# Patient Record
Sex: FEMALE | Race: WHITE | Employment: OTHER | URBAN - METROPOLITAN AREA
[De-identification: names, ages, dates, MRNs, and addresses within clinical notes are randomized per-mention and may not be internally consistent; named-entity substitution may affect disease eponyms.]

---

## 2017-03-29 ENCOUNTER — HOSPITAL ENCOUNTER (EMERGENCY)
Facility: CLINIC | Age: 22
Discharge: HOME | End: 2017-03-29
Attending: EMERGENCY MEDICINE | Admitting: EMERGENCY MEDICINE
Payer: COMMERCIAL

## 2017-03-29 VITALS
TEMPERATURE: 97.9 F | RESPIRATION RATE: 18 BRPM | OXYGEN SATURATION: 99 % | DIASTOLIC BLOOD PRESSURE: 74 MMHG | HEART RATE: 90 BPM | SYSTOLIC BLOOD PRESSURE: 113 MMHG

## 2017-03-29 DIAGNOSIS — G89.29 CHRONIC ABDOMINAL PAIN: ICD-10-CM

## 2017-03-29 DIAGNOSIS — R10.9 CHRONIC ABDOMINAL PAIN: ICD-10-CM

## 2017-03-29 DIAGNOSIS — K92.1 HEMATOCHEZIA: ICD-10-CM

## 2017-03-29 LAB
ALBUMIN SERPL-MCNC: 3.5 G/DL (ref 3.4–5)
ALBUMIN UR-MCNC: NEGATIVE MG/DL
ALP SERPL-CCNC: 49 U/L (ref 40–150)
ALT SERPL W P-5'-P-CCNC: 18 U/L (ref 0–50)
AMORPH CRY #/AREA URNS HPF: ABNORMAL /HPF
ANION GAP SERPL CALCULATED.3IONS-SCNC: 8 MMOL/L (ref 3–14)
APPEARANCE UR: ABNORMAL
AST SERPL W P-5'-P-CCNC: 14 U/L (ref 0–45)
BACTERIA #/AREA URNS HPF: ABNORMAL /HPF
BILIRUB SERPL-MCNC: 0.5 MG/DL (ref 0.2–1.3)
BILIRUB UR QL STRIP: NEGATIVE
BUN SERPL-MCNC: 13 MG/DL (ref 7–30)
CALCIUM SERPL-MCNC: 8.2 MG/DL (ref 8.5–10.1)
CHLORIDE SERPL-SCNC: 105 MMOL/L (ref 94–109)
CO2 SERPL-SCNC: 27 MMOL/L (ref 20–32)
COLOR UR AUTO: YELLOW
CREAT SERPL-MCNC: 0.81 MG/DL (ref 0.52–1.04)
ERYTHROCYTE [DISTWIDTH] IN BLOOD BY AUTOMATED COUNT: 12.3 % (ref 10–15)
GFR SERPL CREATININE-BSD FRML MDRD: ABNORMAL ML/MIN/1.7M2
GLUCOSE SERPL-MCNC: 83 MG/DL (ref 70–99)
GLUCOSE UR STRIP-MCNC: NEGATIVE MG/DL
HCG UR QL: NEGATIVE
HCT VFR BLD AUTO: 37.2 % (ref 35–47)
HEMOCCULT STL QL: NEGATIVE
HGB BLD-MCNC: 12.6 G/DL (ref 11.7–15.7)
HGB UR QL STRIP: ABNORMAL
KETONES UR STRIP-MCNC: NEGATIVE MG/DL
LEUKOCYTE ESTERASE UR QL STRIP: ABNORMAL
LIPASE SERPL-CCNC: 120 U/L (ref 73–393)
MCH RBC QN AUTO: 30.9 PG (ref 26.5–33)
MCHC RBC AUTO-ENTMCNC: 33.9 G/DL (ref 31.5–36.5)
MCV RBC AUTO: 91 FL (ref 78–100)
MUCOUS THREADS #/AREA URNS LPF: PRESENT /LPF
NITRATE UR QL: NEGATIVE
PH UR STRIP: 7 PH (ref 5–7)
PLATELET # BLD AUTO: 308 10E9/L (ref 150–450)
POTASSIUM SERPL-SCNC: 3.6 MMOL/L (ref 3.4–5.3)
PROT SERPL-MCNC: 7.1 G/DL (ref 6.8–8.8)
RBC # BLD AUTO: 4.08 10E12/L (ref 3.8–5.2)
RBC #/AREA URNS AUTO: 2 /HPF (ref 0–2)
SODIUM SERPL-SCNC: 140 MMOL/L (ref 133–144)
SP GR UR STRIP: 1.02 (ref 1–1.03)
SQUAMOUS #/AREA URNS AUTO: 3 /HPF (ref 0–1)
URN SPEC COLLECT METH UR: ABNORMAL
UROBILINOGEN UR STRIP-MCNC: 0 MG/DL (ref 0–2)
WBC # BLD AUTO: 8.8 10E9/L (ref 4–11)
WBC #/AREA URNS AUTO: 13 /HPF (ref 0–2)

## 2017-03-29 PROCEDURE — 81001 URINALYSIS AUTO W/SCOPE: CPT | Performed by: EMERGENCY MEDICINE

## 2017-03-29 PROCEDURE — 81025 URINE PREGNANCY TEST: CPT | Performed by: EMERGENCY MEDICINE

## 2017-03-29 PROCEDURE — 80053 COMPREHEN METABOLIC PANEL: CPT | Performed by: EMERGENCY MEDICINE

## 2017-03-29 PROCEDURE — 83690 ASSAY OF LIPASE: CPT | Performed by: EMERGENCY MEDICINE

## 2017-03-29 PROCEDURE — 87086 URINE CULTURE/COLONY COUNT: CPT | Performed by: EMERGENCY MEDICINE

## 2017-03-29 PROCEDURE — 82272 OCCULT BLD FECES 1-3 TESTS: CPT | Performed by: EMERGENCY MEDICINE

## 2017-03-29 PROCEDURE — 85027 COMPLETE CBC AUTOMATED: CPT | Performed by: EMERGENCY MEDICINE

## 2017-03-29 PROCEDURE — 99284 EMERGENCY DEPT VISIT MOD MDM: CPT | Mod: 25

## 2017-03-29 PROCEDURE — 25000128 H RX IP 250 OP 636: Performed by: EMERGENCY MEDICINE

## 2017-03-29 PROCEDURE — 96374 THER/PROPH/DIAG INJ IV PUSH: CPT

## 2017-03-29 RX ORDER — DROSPIRENONE AND ETHINYL ESTRADIOL 0.02-3(28)
1 KIT ORAL DAILY
COMMUNITY
End: 2020-01-19

## 2017-03-29 RX ORDER — KETOROLAC TROMETHAMINE 15 MG/ML
15 INJECTION, SOLUTION INTRAMUSCULAR; INTRAVENOUS ONCE
Status: COMPLETED | OUTPATIENT
Start: 2017-03-29 | End: 2017-03-29

## 2017-03-29 RX ORDER — MORPHINE SULFATE 4 MG/ML
4 INJECTION, SOLUTION INTRAMUSCULAR; INTRAVENOUS ONCE
Status: DISCONTINUED | OUTPATIENT
Start: 2017-03-29 | End: 2017-03-29 | Stop reason: HOSPADM

## 2017-03-29 RX ORDER — DICYCLOMINE HCL 20 MG
20 TABLET ORAL 4 TIMES DAILY PRN
Qty: 20 TABLET | Refills: 0 | Status: SHIPPED | OUTPATIENT
Start: 2017-03-29 | End: 2017-04-08

## 2017-03-29 RX ADMIN — KETOROLAC TROMETHAMINE 15 MG: 15 INJECTION, SOLUTION INTRAMUSCULAR; INTRAVENOUS at 08:34

## 2017-03-29 ASSESSMENT — ENCOUNTER SYMPTOMS
VOMITING: 0
NAUSEA: 0
FEVER: 0
ANAL BLEEDING: 1
BLOOD IN STOOL: 1
ABDOMINAL PAIN: 1

## 2017-03-29 NOTE — ED AVS SNAPSHOT
[facility] Emergency Department    [address] [place] [address]    Phone:  [phone]    Fax:  [phone]                                       Martha Stanley   MRN: [id number]    Department:  [facility] Emergency Department   Date of Visit:  3/29/2017           Patient Information     Date Of Birth          1995        Your diagnoses for this visit were:     Chronic abdominal pain     Hematochezia        You were seen by Jose Newton MD.      Follow-up Information     Follow up with [facility], [facility]. Schedule an appointment as soon as possible for a visit in 5 days.    Contact information:    [address] [facility] [id number] [phone]          Discharge Instructions       Discharge Instructions  Abdominal Pain    Abdominal pain can be caused by many things. Your evaluation today does not show the exact cause for your pain. Your doctor today has decided that it is unlikely your pain is due to a life threatening problem, or a problem requiring surgery or hospital admission. Sometimes those problems cannot be found right away, so it is very important that you follow up as directed.  Sometimes only the changes which occur over time allow the cause of your pain to be found.    Return to the Emergency Department for a recheck in 8-12 hours if your pain continues.  If your pain gets worse, changes in location, or feels different, return to the Emergency Department right away.    ADULTS:  Return to the Emergency Department right away if:      You get an oral temperature above 102oF or as directed by your doctor.    You have blood in your stools (bright red or black, tarry stools).    You keep throwing up or can t drink liquids.    You see blood when you throw up.    You can t have a bowel movement or you can t pass gas.    Your stomach gets bloated or bigger.    Your skin or the whites of your eyes look yellow.    You  faint.    You have bloody, frequent or painful urination.    You have new symptoms or anything that worries you.    CHILDREN:  Return to the Emergency Department right away if your child has any of the above-listed symptoms or the following:      Pushes your hand away or screams/cries when his/her belly is touched.    You notice your child is very fussy or weak.    Your child is very tired and is too tired to eat or drink.    Your child is dehydrated.  Signs of dehydration can be:  o Your infant has had no wet diapers in 4-5 hours.  o Your older child has not passed urine in 6-8 hours.  o Your infant or child starts to have dry mouth and lips, or no saliva or tears.    PREGNANT WOMEN:  Return to the Emergency Department right away if you have any of the above-listed symptoms or the following:      You have bleeding, leaking fluid or passing tissue from the vagina.    You have worse pain or cramping, or pain in your shoulder or back.    You have vomiting that will not stop.    You have painful or bloody urination.    You have a temperature of 100oF or more.    Your baby is not moving as much as usual.    You faint.    You get a bad headache with or without eye problems and abdominal pain.    You have a convulsion or seizure.    You have unusual discharge from your vagina and abdominal pain.    Abdominal pain is pretty common during pregnancy.  Your pain may or may not be related to your pregnancy. You should follow-up closely with your OB doctor so they can evaluate you and your baby.  Until you follow-up with your regular doctor, do the following:       Avoid sex and do not put anything in your vagina.    Drink clear fluids.    Only take medications approved by your doctor.    MORE INFORMATION:    Appendicitis:  A possible cause of abdominal pain in any person who still has their appendix is acute appendicitis. Appendicitis is often hard to diagnose.  Testing does not always rule out early appendicitis or other  "causes of abdominal pain. Close follow-up with your doctor and re-evaluations may be needed to figure out the reason for your abdominal pain.    Follow-up:  It is very important that you make an appointment with your clinic and go to the appointment.  If you do not follow-up with your primary doctor, it may result in missing an important development which could result in permanent injury or disability and/or lasting pain.  If there is any problem keeping your appointment, call your doctor or return to the Emergency Department.    Medications:  Take your medications as directed by your doctor today.  Before using over-the-counter medications, ask your doctor and make sure to take the medications as directed.  If you have any questions about medications, ask your doctor.    Diet:  Resume your normal diet as much as possible, but do not eat fried, fatty or spicy foods while you have pain.  Do not drink alcohol or have caffeine.  Do not smoke tobacco.    Probiotics: If you have been given an antibiotic, you may want to also take a probiotic pill or eat yogurt with live cultures. Probiotics have \"good bacteria\" to help your intestines stay healthy. Studies have shown that probiotics help prevent diarrhea and other intestine problems (including C. diff infection) when you take antibiotics. You can buy these without a prescription in the pharmacy section of the store.     If you were given a prescription for medicine here today, be sure to read all of the information (including the package insert) that comes with your prescription.  This will include important information about the medicine, its side effects, and any warnings that you need to know about.  The pharmacist who fills the prescription can provide more information and answer questions you may have about the medicine.  If you have questions or concerns that the pharmacist cannot address, please call or return to the Emergency Department.       Discharge " Instructions  Gastrointestinal Bleeding Not Requiring Admission    You have been seen today because of GI (gastrointestinal) bleeding, bleeding somewhere along your digestive tract.  Most common symptoms are blood in the stool or when you have a bowel movement.  The most common causes of minor GI bleeding are ulcers and hemorrhoids.  Other conditions that cause bleeding include abnormal blood vessels in your GI tract, diverticulosis, inflammatory bowel disease, and cancer.  Fortunately, most of the causes of such bleeding are not life-threatening.      It does not appear that your bleeding is serious enough to require admission to the hospital, but it is very important for you to follow up as instructed.    At your follow up, your regular doctor or GI specialist may order further testing such as:    Blood and stool tests.    Endoscopy and/or colonoscopy, where a tube with a camera is used to look at your digestive tract.    Other very specialized tests depending on your symptoms.    Return to the Emergency Department right away if you:    Develop fever with an oral temperature above 101 F.    Vomit blood or something that looks like coffee grounds.    Have a bowel movement that looks like tar or has a large amount of blood in it.    Feel weak, light-headed, or faint.    Have a racing heartbeat.    Have abdominal pain that is new or increasing.    Have new symptoms that worry you.    What can I do to help myself?    Take any acid reducing medication prescribed by your doctor.    Avoid over the counter medications such as aspirin and Advil , Nuprin  (ibuprofen) that can thin your blood or irritate your stomach, making ulcers worse.  If you were given a prescription for medicine here today, be sure to read all of the information (including the package insert) that comes with your prescription.  This will include important information about the medicine, its side effects, and any warnings that you need to know about.   The pharmacist who fills the prescription can provide more information and answer questions you may have about the medicine.  If you have questions or concerns that the pharmacist cannot address, please call or return to the Emergency Department.         24 Hour Appointment Hotline       To make an appointment at any [facility], call [phone] ([phone]). If you don't have a family doctor or clinic, we will help you find one. [facility] are conveniently located to serve the needs of you and your family.          ED Discharge Orders     GASTROENTEROLOGY ADULT REF CONSULT ONLY       Preferred Location: [facility] [phone]      Please be aware that coverage of these services is subject to the terms and limitations of your health insurance plan.  Call member services at your health plan with any benefit or coverage questions.  Any procedures must be performed at a [facility] facility OR coordinated by your clinic's referral office.    Please bring the following with you to your appointment:    (1) Any X-Rays, CTs or MRIs which have been performed.  Contact the facility where they were done to arrange for  prior to your scheduled appointment.    (2) List of current medications   (3) This referral request   (4) Any documents/labs given to you for this referral                     Review of your medicines      START taking        Dose / Directions Last dose taken    dicyclomine 20 MG tablet   Commonly known as:  BENTYL   Dose:  20 mg   Quantity:  20 tablet        Take 1 tablet (20 mg) by mouth 4 times daily as needed   Refills:  0          Our records show that you are taking the medicines listed below. If these are incorrect, please call your family doctor or clinic.        Dose / Directions Last dose taken    drospirenone-ethinyl estradiol 3-0.02 MG per tablet   Commonly known as:  RUTH   Dose:  1 tablet        Take 1 tablet by mouth daily   Refills:  0        EFFEXOR PO   Dose:  337.5 mg         Take 337.5 mg by mouth daily   Refills:  0        LAMICTAL PO   Dose:  25 mg        Take 25 mg by mouth daily   Refills:  0                Prescriptions were sent or printed at these locations (1 Prescription)                   Other Prescriptions                Printed at Department/Unit printer (1 of 1)         dicyclomine (BENTYL) 20 MG tablet                Procedures and tests performed during your visit     CBC (platelets, no diff)    Comprehensive metabolic panel    HCG qualitative urine    Lipase    Peripheral IV catheter    Stool: occult blood    UA with Microscopic      Orders Needing Specimen Collection     None      Pending Results     No orders found from 3/27/2017 to 3/30/2017.            Pending Culture Results     No orders found from 3/27/2017 to 3/30/2017.             Test Results from your hospital stay     3/29/2017  8:29 AM - Interface, Flexilab Results      Component Results     Component Value Ref Range & Units Status    WBC 8.8 4.0 - 11.0 10e9/L Final    RBC Count 4.08 3.8 - 5.2 10e12/L Final    Hemoglobin 12.6 11.7 - 15.7 g/dL Final    Hematocrit 37.2 35.0 - 47.0 % Final    MCV 91 78 - 100 fl Final    MCH 30.9 26.5 - 33.0 pg Final    MCHC 33.9 31.5 - 36.5 g/dL Final    RDW 12.3 10.0 - 15.0 % Final    Platelet Count 308 150 - 450 10e9/L Final         3/29/2017  8:46 AM - Interface, Flexilab Results      Component Results     Component Value Ref Range & Units Status    Sodium 140 133 - 144 mmol/L Final    Potassium 3.6 3.4 - 5.3 mmol/L Final    Chloride 105 94 - 109 mmol/L Final    Carbon Dioxide 27 20 - 32 mmol/L Final    Anion Gap 8 3 - 14 mmol/L Final    Glucose 83 70 - 99 mg/dL Final    Urea Nitrogen 13 7 - 30 mg/dL Final    Creatinine 0.81 0.52 - 1.04 mg/dL Final    GFR Estimate >90  Non  GFR Calc   >60 mL/min/1.7m2 Final    GFR Estimate If Black >90   GFR Calc   >60 mL/min/1.7m2 Final    Calcium 8.2 (L) 8.5 - 10.1 mg/dL Final    Bilirubin Total 0.5 0.2  - 1.3 mg/dL Final    Albumin 3.5 3.4 - 5.0 g/dL Final    Protein Total 7.1 6.8 - 8.8 g/dL Final    Alkaline Phosphatase 49 40 - 150 U/L Final    ALT 18 0 - 50 U/L Final    AST 14 0 - 45 U/L Final         3/29/2017  8:46 AM - Interface, Flexilab Results      Component Results     Component Value Ref Range & Units Status    Lipase 120 73 - 393 U/L Final         3/29/2017 10:32 AM - Interface, Flexilab Results      Component Results     Component Value Ref Range & Units Status    Color Urine Yellow  Final    Appearance Urine Cloudy  Final    Glucose Urine Negative NEG mg/dL Final    Bilirubin Urine Negative NEG Final    Ketones Urine Negative NEG mg/dL Final    Specific Gravity Urine 1.021 1.003 - 1.035 Final    Blood Urine Small (A) NEG Final    pH Urine 7.0 5.0 - 7.0 pH Final    Protein Albumin Urine Negative NEG mg/dL Final    Urobilinogen mg/dL 0.0 0.0 - 2.0 mg/dL Final    Nitrite Urine Negative NEG Final    Leukocyte Esterase Urine Trace (A) NEG Final    Source Clean catch urine  Final    WBC Urine 13 (H) 0 - 2 /HPF Final    RBC Urine 2 0 - 2 /HPF Final    Bacteria Urine Moderate (A) NEG /HPF Final    Squamous Epithelial /HPF Urine 3 (H) 0 - 1 /HPF Final    Mucous Urine Present (A) NEG /LPF Final    Amorphous Crystals Few (A) NEG /HPF Final         3/29/2017 10:29 AM - Interface, Flexilab Results      Component Results     Component Value Ref Range & Units Status    HCG Qual Urine Negative NEG Final         3/29/2017  9:59 AM - Interface, Flexilab Results      Component Results     Component Value Ref Range & Units Status    Occult Blood Negative NEG Final                Clinical Quality Measure: Blood Pressure Screening     Your blood pressure was checked while you were in the emergency department today. The last reading we obtained was  BP: 126/83 . Please read the guidelines below about what these numbers mean and what you should do about them.  If your systolic blood pressure (the top number) is less than 120  "and your diastolic blood pressure (the bottom number) is less than 80, then your blood pressure is normal. There is nothing more that you need to do about it.  If your systolic blood pressure (the top number) is 120-139 or your diastolic blood pressure (the bottom number) is 80-89, your blood pressure may be higher than it should be. You should have your blood pressure rechecked within a year by a primary care provider.  If your systolic blood pressure (the top number) is 140 or greater or your diastolic blood pressure (the bottom number) is 90 or greater, you may have high blood pressure. High blood pressure is treatable, but if left untreated over time it can put you at risk for heart attack, stroke, or kidney failure. You should have your blood pressure rechecked by a primary care provider within the next 4 weeks.  If your provider in the emergency department today gave you specific instructions to follow-up with your doctor or provider even sooner than that, you should follow that instruction and not wait for up to 4 weeks for your follow-up visit.        Thank you for choosing [facility]       Thank you for choosing [facility] for your care. Our goal is always to provide you with excellent care. Hearing back from our patients is one way we can continue to improve our services. Please take a few minutes to complete the written survey that you may receive in the mail after you visit with us. Thank you!        [email or web address] Information     [facility] lets you send messages to your doctor, view your test results, renew your prescriptions, schedule appointments and more. To sign up, go to [email or web address] . Click on \"Log in\" on the left side of the screen, which will take you to the Welcome page. Then click on \"Sign up Now\" on the right side of the page.     You will be asked to enter the access code listed below, as well as some personal information. Please follow the directions to create your username and password.   "   Your access code is: [email or web address]  Expires: 2017 11:04 AM     Your access code will  in 90 days. If you need help or a new code, please call your [facility] or [phone].        Care EveryWhere ID     This is your Care EveryWhere ID. This could be used by other organizations to access your [facility] medical records  [id number]        After Visit Summary       This is your record. Keep this with you and show to your community pharmacist(s) and doctor(s) at your next visit.

## 2017-03-29 NOTE — LETTER
[facility] EMERGENCY DEPARTMENT  [address] [place] [address]  [phone]    2017    Martha Stanley  [address] [place] [id number] [phone] (home) None (work)    : 1995      To Whom it may concern:    Martha Stanley was seen in our Emergency Department today, 2017.  I expect her condition to improve over the next 1 day.  She may return to work/school when improved.    Sincerely,        Coral Carroll

## 2017-03-29 NOTE — DISCHARGE INSTRUCTIONS
Discharge Instructions  Abdominal Pain    Abdominal pain can be caused by many things. Your evaluation today does not show the exact cause for your pain. Your doctor today has decided that it is unlikely your pain is due to a life threatening problem, or a problem requiring surgery or hospital admission. Sometimes those problems cannot be found right away, so it is very important that you follow up as directed.  Sometimes only the changes which occur over time allow the cause of your pain to be found.    Return to the Emergency Department for a recheck in 8-12 hours if your pain continues.  If your pain gets worse, changes in location, or feels different, return to the Emergency Department right away.    ADULTS:  Return to the Emergency Department right away if:      You get an oral temperature above 102oF or as directed by your doctor.    You have blood in your stools (bright red or black, tarry stools).    You keep throwing up or can t drink liquids.    You see blood when you throw up.    You can t have a bowel movement or you can t pass gas.    Your stomach gets bloated or bigger.    Your skin or the whites of your eyes look yellow.    You faint.    You have bloody, frequent or painful urination.    You have new symptoms or anything that worries you.    CHILDREN:  Return to the Emergency Department right away if your child has any of the above-listed symptoms or the following:      Pushes your hand away or screams/cries when his/her belly is touched.    You notice your child is very fussy or weak.    Your child is very tired and is too tired to eat or drink.    Your child is dehydrated.  Signs of dehydration can be:  o Your infant has had no wet diapers in 4-5 hours.  o Your older child has not passed urine in 6-8 hours.  o Your infant or child starts to have dry mouth and lips, or no saliva or tears.    PREGNANT WOMEN:  Return to the Emergency Department right away if you have any of the above-listed symptoms or  the following:      You have bleeding, leaking fluid or passing tissue from the vagina.    You have worse pain or cramping, or pain in your shoulder or back.    You have vomiting that will not stop.    You have painful or bloody urination.    You have a temperature of 100oF or more.    Your baby is not moving as much as usual.    You faint.    You get a bad headache with or without eye problems and abdominal pain.    You have a convulsion or seizure.    You have unusual discharge from your vagina and abdominal pain.    Abdominal pain is pretty common during pregnancy.  Your pain may or may not be related to your pregnancy. You should follow-up closely with your OB doctor so they can evaluate you and your baby.  Until you follow-up with your regular doctor, do the following:       Avoid sex and do not put anything in your vagina.    Drink clear fluids.    Only take medications approved by your doctor.    MORE INFORMATION:    Appendicitis:  A possible cause of abdominal pain in any person who still has their appendix is acute appendicitis. Appendicitis is often hard to diagnose.  Testing does not always rule out early appendicitis or other causes of abdominal pain. Close follow-up with your doctor and re-evaluations may be needed to figure out the reason for your abdominal pain.    Follow-up:  It is very important that you make an appointment with your clinic and go to the appointment.  If you do not follow-up with your primary doctor, it may result in missing an important development which could result in permanent injury or disability and/or lasting pain.  If there is any problem keeping your appointment, call your doctor or return to the Emergency Department.    Medications:  Take your medications as directed by your doctor today.  Before using over-the-counter medications, ask your doctor and make sure to take the medications as directed.  If you have any questions about medications, ask your doctor.    Diet:   "Resume your normal diet as much as possible, but do not eat fried, fatty or spicy foods while you have pain.  Do not drink alcohol or have caffeine.  Do not smoke tobacco.    Probiotics: If you have been given an antibiotic, you may want to also take a probiotic pill or eat yogurt with live cultures. Probiotics have \"good bacteria\" to help your intestines stay healthy. Studies have shown that probiotics help prevent diarrhea and other intestine problems (including C. diff infection) when you take antibiotics. You can buy these without a prescription in the pharmacy section of the store.     If you were given a prescription for medicine here today, be sure to read all of the information (including the package insert) that comes with your prescription.  This will include important information about the medicine, its side effects, and any warnings that you need to know about.  The pharmacist who fills the prescription can provide more information and answer questions you may have about the medicine.  If you have questions or concerns that the pharmacist cannot address, please call or return to the Emergency Department.       Discharge Instructions  Gastrointestinal Bleeding Not Requiring Admission    You have been seen today because of GI (gastrointestinal) bleeding, bleeding somewhere along your digestive tract.  Most common symptoms are blood in the stool or when you have a bowel movement.  The most common causes of minor GI bleeding are ulcers and hemorrhoids.  Other conditions that cause bleeding include abnormal blood vessels in your GI tract, diverticulosis, inflammatory bowel disease, and cancer.  Fortunately, most of the causes of such bleeding are not life-threatening.      It does not appear that your bleeding is serious enough to require admission to the hospital, but it is very important for you to follow up as instructed.    At your follow up, your regular doctor or GI specialist may order further testing " such as:    Blood and stool tests.    Endoscopy and/or colonoscopy, where a tube with a camera is used to look at your digestive tract.    Other very specialized tests depending on your symptoms.    Return to the Emergency Department right away if you:    Develop fever with an oral temperature above 101 F.    Vomit blood or something that looks like coffee grounds.    Have a bowel movement that looks like tar or has a large amount of blood in it.    Feel weak, light-headed, or faint.    Have a racing heartbeat.    Have abdominal pain that is new or increasing.    Have new symptoms that worry you.    What can I do to help myself?    Take any acid reducing medication prescribed by your doctor.    Avoid over the counter medications such as aspirin and Advil , Nuprin  (ibuprofen) that can thin your blood or irritate your stomach, making ulcers worse.  If you were given a prescription for medicine here today, be sure to read all of the information (including the package insert) that comes with your prescription.  This will include important information about the medicine, its side effects, and any warnings that you need to know about.  The pharmacist who fills the prescription can provide more information and answer questions you may have about the medicine.  If you have questions or concerns that the pharmacist cannot address, please call or return to the Emergency Department.

## 2017-03-29 NOTE — ED AVS SNAPSHOT
[facility] Emergency Department    [address]    [facility] [address]    Phone:  [phone]    Fax:  [phone]                                       Martha Stanley   MRN: [id number]    Department:  [facility] Emergency Department   Date of Visit:  3/29/2017           After Visit Summary Signature Page     I have received my discharge instructions, and my questions have been answered. I have discussed any challenges I see with this plan with the nurse or doctor.    ..........................................................................................................................................  Patient/Patient Representative Signature      ..........................................................................................................................................  Patient Representative Print Name and Relationship to Patient    ..................................................               ................................................  Date                                            Time    ..........................................................................................................................................  Reviewed by Signature/Title    ...................................................              ..............................................  Date                                                            Time

## 2017-03-29 NOTE — ED PROVIDER NOTES
History     Chief Complaint:  Rectal Bleeding      HPI   Martha Stanley is a 22 year old female who presents with her mother for evaluation of abdominal pain and rectal bleeding. The patient reports that she has had abdominal pain for the last 3 years. She has had ultrasounds in the past, but the etiology of her pain is unknown. She has been seen in the ED here as well as at [facility]. Review of her CareEverywhere shows that the US RUQ performed at [facility] on 11/17/2015 was negative. She indicates pain around either side of her umbilicus, currently rating the pain as 6/10 in severity. The pain occurs at random times and she notes that the pain is sometimes improved if she sits down. She is currently taking Effexor and birth control pills. Last night she developed bloody stool. She reports that the blood is mixed in with the stool and wiping shows bright red blood. She has had three episodes of blood stool. She is on Spironolactone for acne. Her mother is currently being testing for autoimmune GI etiologies. They deny any known family history of GI issues. The patient denies any fevers, nausea, vomiting, vaginal bleeding, vaginal discharge, urinary symptoms, or any other physical concerns at this time.    Allergies:  Penicillins - hives     Medications:    Venlafaxine HCl (EFFEXOR PO)  Drospirenone-ethinyl estradiol (RUTH) 3-0.02 MG per tablet  Spironolactone     Past Medical History:    Acne    Past Surgical History:    The patient does not have any past pertinent surgical history.     Family History:  Family history reviewed. No relevant family history.     Social History:  Marital status: Single  Tobacco use: Never  Alcohol use: No  Patient presents to the ED with her mother.        Review of Systems   Constitutional: Negative for fever.   Gastrointestinal: Positive for abdominal pain (intermittent for 3 years;located around umbilicus), anal bleeding and blood in stool (began last night. 3 episodes). Negative  for nausea and vomiting.   Genitourinary: Negative for vaginal bleeding and vaginal discharge.        Negative for urinary symptoms.    All other systems reviewed and are negative.      Physical Exam   First Vitals:  BP: 130/88  Heart Rate: 103  Temp: 97.9  F (36.6  C)  Resp: 18  SpO2: 100 %    Physical Exam    Constitutional:  Pleasant, age appropriate.       Resting comfortably in the bed.  Eyes:    Conjunctiva normal  Neck:    Supple, no meningismus.     CV:     Regular rate and rhythm.      No murmurs, rubs or gallops.     No lower extremity edema.  PULM:    Clear to auscultation bilateral.       No respiratory distress.      Good air exchange.  ABD:    Soft, non-distended.       Minimal diffuse abdominal tenderness.     Bowel sounds normal.     No pulsatile masses.       No rebound, guarding or rigidity.     No CVA tenderness.      No hepatosplenomegaly.  :    No external hemorrhoids.     No anal fissure.     Good sphincter tone.      No rectal mass.     No blood on gloved finger.  MSK:     No gross deformity to all four extremities.   LYMPH:   No cervical lymphadenopathy.  NEURO:   Alert.  Good muscular tone, no atrophy.   Skin:    Warm, dry and intact.    Psych:    Mood is good and affect is appropriate.      Emergency Department Course     Laboratory:  UA: Cloudy, Yellow, Blood small (A), Leukocyte esterase Trace (A), WBC/HPF 13 (H), Bacteria Moderate (A), Squam Epithelial/HPF 3 (H), Mucous present (A), Amorphous crystals Few (A), otherwise WNL     HCG qualitative urine: Negative     Stool: occult blood: Negative     CMP: Ca 8.2 (L), o/w WNL (Cr 0.81)  CBC: WNL (WBC 8.8, HGB 12.6, )     Lipase: 120 (WNL)     Interventions:  0834 Toradol 15 mg, IV    Emergency Department Course:  0803 Nursing notes and vitals reviewed. I obtained a history from the patient. I performed an exam of the patient as documented above. We discussed the plan of care including laboratory studies and intervention    I  personally reviewed the laboratory results with the Patient and mother and answered all related questions prior to discharge.     Findings and plan explained to the Patient and mother. Patient discharged home with instructions regarding supportive care, medications, and reasons to return. The importance of close follow-up was reviewed. The patient was prescribed Dicyclomine and GI referral.        Impression & Plan      Medical Decision Making:  Martha Stanley is a 22 year old female with a history of chronic abdominal pain over the last three years who presents to the emergency department with persistent pain and intermittent episodes of rectal bleeding. On examination, she appears well. Abdominal examination is benign with no concern for surgical abdomen or intraabdominal catastrophe. Basic laboratory studies are reassuring, including a normal HGB and a negative hemoccult test. Her history is most suggestive of a chronic intraabdominal condition, specifically inflammatory bowel disease and less likely dietary intolerance, or IBS. The patient will by discharged home on Bentyl and will be referred to [facility], as I believe she may benefit from a colonoscopy out of concern for inflammatory bowel disease. The patient is safe for discharge home.       Diagnosis:    ICD-10-CM    1. Chronic abdominal pain R10.9 GASTROENTEROLOGY ADULT REF CONSULT ONLY    G89.29 Urine Culture   2. Hematochezia K92.1 GASTROENTEROLOGY ADULT REF CONSULT ONLY       Disposition:  discharged to home with GI referral.    Discharge Medications:  Discharge Medication List as of 3/29/2017 11:04 AM      START taking these medications    Details   dicyclomine (BENTYL) 20 MG tablet Take 1 tablet (20 mg) by mouth 4 times daily as needed, Disp-20 tablet, R-0, Local Print           Soo ZHENG, am serving as a scribe on March 29, 2017 at 8:03 AM to personally document services performed by Jose Newton MD, based on my observations and  the provider's statements to me.       Soo Garland  3/29/2017   [facility] EMERGENCY DEPARTMENT       Jose Newton MD  03/29/17 3015

## 2017-03-29 NOTE — ED NOTES
Patient has a history of abdominal pain for the last 3 years and has been worked up for it before. Patient started having the abdominal pain again, and yesterday around 17:00, patient developed bright, red bloody stool. She has had 3 episodes. ABCDs intact. Alert and oriented x 4.

## 2017-03-30 LAB
BACTERIA SPEC CULT: NORMAL
Lab: NORMAL
MICRO REPORT STATUS: NORMAL
SPECIMEN SOURCE: NORMAL

## 2018-06-06 ENCOUNTER — OFFICE VISIT (OUTPATIENT)
Dept: URGENT CARE | Facility: URGENT CARE | Age: 23
End: 2018-06-06
Payer: COMMERCIAL

## 2018-06-06 VITALS
SYSTOLIC BLOOD PRESSURE: 110 MMHG | TEMPERATURE: 99 F | HEART RATE: 98 BPM | OXYGEN SATURATION: 98 % | DIASTOLIC BLOOD PRESSURE: 74 MMHG | WEIGHT: 122 LBS

## 2018-06-06 DIAGNOSIS — M26.609 TEMPOROMANDIBULAR JOINT DISORDER: Primary | ICD-10-CM

## 2018-06-06 PROCEDURE — 99203 OFFICE O/P NEW LOW 30 MIN: CPT | Performed by: PHYSICIAN ASSISTANT

## 2018-06-06 ASSESSMENT — ENCOUNTER SYMPTOMS
SORE THROAT: 0
DIARRHEA: 0
NAUSEA: 0
COUGH: 0
VOMITING: 0

## 2018-06-06 NOTE — MR AVS SNAPSHOT
After Visit Summary   6/6/2018    Martha Stanley    MRN: [id number]           Patient Information     Date Of Birth          1995        Visit Information        Provider Department      6/6/2018 8:00 PM Allyson Amaral PA-C [facility] URGENT [facility]ARE        Today's Diagnoses     Temporomandibular joint disorder    -  1      Care Instructions      TMJ Syndrome  The temporomandibular joint (TMJ) is the joint that connects your lower jaw to your head. You can feel it in front of your ears when you open and close your mouth. TMJ disorders involve chronic or recurrent pain in the joint. When treated, symptoms of TMJ disorders usually go away within a few months.  Causes  There is no widely agreed-on cause of TMJ disorders. They have been linked to injury, arthritis, chronic fatigue syndrome, and fibromyalgia. A definite connection has not been shown, though.  Symptoms    Pain in the face, jaw, or neck    Pain with jaw movement or chewing    Locking or catching sensation of the jaw    Clicking, popping, or grinding sounds with movement of the TMJ    Headache    Ear pain  Home care  Modest, nonsurgical treatments are a good first step toward relieving symptoms. Try the approaches described below.    Rest the jaw by avoiding crunchy or hard-to-chew foods. Don t eat hard or sticky candies. Soft foods and liquids are easier on the jaw.    Protect your jaw while yawning. If you need to yawn, put your fist under your chin to prevent your mouth from opening up too wide.    To help relieve pain, try applying hot or cold packs to the painful area. Try both hot and cold to find out which works best for you. To make a cold pack, put ice cubes in a plastic bag that seals at the top. Wrap the bag in a clean, thin towel or cloth. Never put ice or an ice pack directly on the skin. If you use hot packs (small towels soaked in hot water), be careful not to burn yourself.    You may take acetaminophen or  ibuprofen for pain, unless you were given a different pain medicine. (Note: If you have chronic liver or kidney disease or have ever had a stomach ulcer or gastrointestinal bleeding, talk with your healthcare provider before using these medicines. Also talk to your provider if you are taking medicine to prevent blood clots.) Don t give aspirin to a child younger than age 19 unless directed by the child s provider. Taking aspirin can put a child at risk for Reye syndrome. This is a rare but very serious disorder that most often affects the brain and the liver.  Reducing stress  If stress seems to be contributing to your symptoms, try to identify the sources of stress in your life. These aren t always obvious. Common stressors include:    Everyday hassles. These include things such as traffic jams, missed appointments, or car trouble.    Major life changes. These can be good, such as a new baby or job promotion. And they can be bad, such as losing a job or losing a loved one.    Overload. The feeling that you have too many responsibilities and can't take care of everything at once.    Helplessness. Feeling like your problems are more than you can solve.  When possible, do something about your sources of stress. See if you can avoid hassles, limit the amount of change in your life at one time, and take breaks when you feel overloaded.  Unfortunately, many stressful situations cannot be avoided. So learning how to manage stress better is very important. Getting regular exercise, eating nutritious, balanced meals, and getting adequate rest all help to make everyday stress more manageable. Certain techniques are also helpful: relaxation and breathing exercises, visualization, biofeedback, meditation, or simply taking some time out to clear your mind. For more information, talk with your healthcare provider.  Follow-up care  Follow up with your healthcare provider, or as advised. Further testing and additional treatment  may be required. If changes to your lifestyle do not improve your symptoms, talk with your healthcare provider about other available therapies. These include bite guards for help with teeth grinding, stress management techniques, and more. If stress is an important factor and does not respond to the above simple measures, talk with your healthcare provider about a referral for stress management.  If X-rays were done, they will be reviewed by a specialist. You will be notified of the results, especially if they affect treatment.  Call 911  Call 911 if any of these occur:    Trouble breathing or swallowing, wheezing    Confusion    Extreme drowsiness or trouble awakening    Fainting or loss of consciousness    Rapid heart rate  When to seek medical advice  Call your healthcare provider right away if any of these occur:    Swollen or red face    Pain gets worse    Neck, mouth, tooth, or throat pain gets worse    Fever of 100.4 F (38 C) or higher, or as directed by your healthcare provider  Date Last Reviewed: 10/1/2017    6349-9436 [employer]. [address] [place] [address]. All rights reserved. This information is not intended as a substitute for professional medical care. Always follow your healthcare professional's instructions.                Follow-ups after your visit        Who to contact     If you have questions or need follow up information about today's clinic visit or your schedule please contact [facility] directly at [phone].  Normal or non-critical lab and imaging results will be communicated to you by MyChart, letter or phone within 4 business days after the clinic has received the results. If you do not hear from us within 7 days, please contact the clinic through MyChart or phone. If you have a critical or abnormal lab result, we will notify you by phone as soon as possible.  Submit refill requests through [employer] or call your pharmacy and they will  "forward the refill request to us. Please allow 3 business days for your refill to be completed.          Additional Information About Your Visit        MyChart Information     [employer] lets you send messages to your doctor, view your test results, renew your prescriptions, schedule appointments and more. To sign up, go to [email or web address] . Click on \"Log in\" on the left side of the screen, which will take you to the Welcome page. Then click on \"Sign up Now\" on the right side of the page.     You will be asked to enter the access code listed below, as well as some personal information. Please follow the directions to create your username and password.     Your access code is: [email or web address]  Expires: 2018  8:18 PM     Your access code will  in 90 days. If you need help or a new code, please call your [place] clinic or [phone].        Care EveryWhere ID     This is your Care EveryWhere ID. This could be used by other organizations to access your [place] medical records  [id number]        Your Vitals Were     Pulse Temperature Pulse Oximetry             98 99  F (37.2  C) (Oral) 98%          Blood Pressure from Last 3 Encounters:   18 110/74   17 113/74    Weight from Last 3 Encounters:   18 122 lb (55.3 kg)              Today, you had the following     No orders found for display       Primary Care Provider Office Phone # Fax #    "[employer] [facility] [phone] [address] [facility] [address]        Equal Access to Services     SORIN MARIE : Hadii daniel pereira hadasho Soannalisaali, waaxda luqadaha, qaybta kaalmada adeegyada, shay miranda. So [facility] [phone].    ATENCIÓN: Si habla español, tiene a juarez disposición servicios gratuitos de asistencia lingüística. Llame al [phone].    We comply with applicable federal civil rights laws and [place] laws. We do not discriminate on the basis of race, color, " national origin, age, disability, sex, sexual orientation, or gender identity.            Thank you!     Thank you for choosing [facility]  for your care. Our goal is always to provide you with excellent care. Hearing back from our patients is one way we can continue to improve our services. Please take a few minutes to complete the written survey that you may receive in the mail after your visit with us. Thank you!             Your Updated Medication List - Protect others around you: Learn how to safely use, store and throw away your medicines at www.disposemymeds.org.          This list is accurate as of 6/6/18  8:18 PM.  Always use your most recent med list.                   Brand Name Dispense Instructions for use Diagnosis    drospirenone-ethinyl estradiol 3-0.02 MG per tablet    RUTH     Take 1 tablet by mouth daily        EFFEXOR PO      Take 337.5 mg by mouth daily        LAMICTAL PO      Take 25 mg by mouth daily

## 2018-06-07 NOTE — PATIENT INSTRUCTIONS
TMJ Syndrome  The temporomandibular joint (TMJ) is the joint that connects your lower jaw to your head. You can feel it in front of your ears when you open and close your mouth. TMJ disorders involve chronic or recurrent pain in the joint. When treated, symptoms of TMJ disorders usually go away within a few months.  Causes  There is no widely agreed-on cause of TMJ disorders. They have been linked to injury, arthritis, chronic fatigue syndrome, and fibromyalgia. A definite connection has not been shown, though.  Symptoms    Pain in the face, jaw, or neck    Pain with jaw movement or chewing    Locking or catching sensation of the jaw    Clicking, popping, or grinding sounds with movement of the TMJ    Headache    Ear pain  Home care  Modest, nonsurgical treatments are a good first step toward relieving symptoms. Try the approaches described below.    Rest the jaw by avoiding crunchy or hard-to-chew foods. Don t eat hard or sticky candies. Soft foods and liquids are easier on the jaw.    Protect your jaw while yawning. If you need to yawn, put your fist under your chin to prevent your mouth from opening up too wide.    To help relieve pain, try applying hot or cold packs to the painful area. Try both hot and cold to find out which works best for you. To make a cold pack, put ice cubes in a plastic bag that seals at the top. Wrap the bag in a clean, thin towel or cloth. Never put ice or an ice pack directly on the skin. If you use hot packs (small towels soaked in hot water), be careful not to burn yourself.    You may take acetaminophen or ibuprofen for pain, unless you were given a different pain medicine. (Note: If you have chronic liver or kidney disease or have ever had a stomach ulcer or gastrointestinal bleeding, talk with your healthcare provider before using these medicines. Also talk to your provider if you are taking medicine to prevent blood clots.) Don t give aspirin to a child younger than age 19  unless directed by the child s provider. Taking aspirin can put a child at risk for Reye syndrome. This is a rare but very serious disorder that most often affects the brain and the liver.  Reducing stress  If stress seems to be contributing to your symptoms, try to identify the sources of stress in your life. These aren t always obvious. Common stressors include:    Everyday hassles. These include things such as traffic jams, missed appointments, or car trouble.    Major life changes. These can be good, such as a new baby or job promotion. And they can be bad, such as losing a job or losing a loved one.    Overload. The feeling that you have too many responsibilities and can't take care of everything at once.    Helplessness. Feeling like your problems are more than you can solve.  When possible, do something about your sources of stress. See if you can avoid hassles, limit the amount of change in your life at one time, and take breaks when you feel overloaded.  Unfortunately, many stressful situations cannot be avoided. So learning how to manage stress better is very important. Getting regular exercise, eating nutritious, balanced meals, and getting adequate rest all help to make everyday stress more manageable. Certain techniques are also helpful: relaxation and breathing exercises, visualization, biofeedback, meditation, or simply taking some time out to clear your mind. For more information, talk with your healthcare provider.  Follow-up care  Follow up with your healthcare provider, or as advised. Further testing and additional treatment may be required. If changes to your lifestyle do not improve your symptoms, talk with your healthcare provider about other available therapies. These include bite guards for help with teeth grinding, stress management techniques, and more. If stress is an important factor and does not respond to the above simple measures, talk with your healthcare provider about a referral for  stress management.  If X-rays were done, they will be reviewed by a specialist. You will be notified of the results, especially if they affect treatment.  Call 911  Call 911 if any of these occur:    Trouble breathing or swallowing, wheezing    Confusion    Extreme drowsiness or trouble awakening    Fainting or loss of consciousness    Rapid heart rate  When to seek medical advice  Call your healthcare provider right away if any of these occur:    Swollen or red face    Pain gets worse    Neck, mouth, tooth, or throat pain gets worse    Fever of 100.4 F (38 C) or higher, or as directed by your healthcare provider  Date Last Reviewed: 10/1/2017    7046-3878 [employer]. [address] [place] [address], [place] [address]. All rights reserved. This information is not intended as a substitute for professional medical care. Always follow your healthcare professional's instructions.

## 2018-06-07 NOTE — PROGRESS NOTES
SUBJECTIVE:   Martha Stanley is a 23 year old female presenting with a chief complaint of   Chief Complaint   Patient presents with     Urgent Care     Otalgia     right ear pain started one hour ago, now making face painful        She is a new patient of [facility].    Patient presenting to urgent care [facility] with a complaint of right ear pain that started about an hour ago.  She denies any recent URI symptoms, denies any fevers or chills. No ear injury or trauma. No ear drainage. No treatment tried prior to arrival.      Review of Systems   HENT: Positive for ear pain. Negative for ear discharge and sore throat.    Respiratory: Negative for cough.    Gastrointestinal: Negative for diarrhea, nausea and vomiting.       No past medical history on file.  No family history on file.  Current Outpatient Prescriptions   Medication Sig Dispense Refill     drospirenone-ethinyl estradiol (RUTH) 3-0.02 MG per tablet Take 1 tablet by mouth daily       LamoTRIgine (LAMICTAL PO) Take 25 mg by mouth daily       Venlafaxine HCl (EFFEXOR PO) Take 337.5 mg by mouth daily       Social History   Substance Use Topics     Smoking status: Never Smoker     Smokeless tobacco: Never Used     Alcohol use No       OBJECTIVE  /74  Pulse 98  Temp 99  F (37.2  C) (Oral)  Wt 122 lb (55.3 kg)  SpO2 98%    Physical Exam   General appearance: 23-year-old female in no acute distress  Head is atraumatic  ENT: Bilateral ear canals partially occluded with cerumen, bilateral TM's are intact, she is tender with palpation over right TMJ, throat is moist and pink  Lungs are clear to auscultation bilaterally  Chest with normal heart tones S1-S2    Labs:  No results found for this or any previous visit (from the past 24 hour(s)).    X-Ray was not done.    ASSESSMENT:      ICD-10-CM    1. Temporomandibular joint disorder M26.609         Medical Decision Making:    Differential Diagnosis:  Otitis media, otitis externa, eustachian tube  dysfunction    Serious Comorbid Conditions:  Adult:  None    PLAN:  Right TMJ pain: Anti-inflammatories prn pain, ice or heat packs, avoid chewing hard foods.  Follow-up if any worsening symptoms.  Patient understands and agrees with the plan.      Followup:    If not improving or if condition worsens, follow up with your Primary Care Provider    Patient Instructions     TMJ Syndrome  The temporomandibular joint (TMJ) is the joint that connects your lower jaw to your head. You can feel it in front of your ears when you open and close your mouth. TMJ disorders involve chronic or recurrent pain in the joint. When treated, symptoms of TMJ disorders usually go away within a few months.  Causes  There is no widely agreed-on cause of TMJ disorders. They have been linked to injury, arthritis, chronic fatigue syndrome, and fibromyalgia. A definite connection has not been shown, though.  Symptoms    Pain in the face, jaw, or neck    Pain with jaw movement or chewing    Locking or catching sensation of the jaw    Clicking, popping, or grinding sounds with movement of the TMJ    Headache    Ear pain  Home care  Modest, nonsurgical treatments are a good first step toward relieving symptoms. Try the approaches described below.    Rest the jaw by avoiding crunchy or hard-to-chew foods. Don t eat hard or sticky candies. Soft foods and liquids are easier on the jaw.    Protect your jaw while yawning. If you need to yawn, put your fist under your chin to prevent your mouth from opening up too wide.    To help relieve pain, try applying hot or cold packs to the painful area. Try both hot and cold to find out which works best for you. To make a cold pack, put ice cubes in a plastic bag that seals at the top. Wrap the bag in a clean, thin towel or cloth. Never put ice or an ice pack directly on the skin. If you use hot packs (small towels soaked in hot water), be careful not to burn yourself.    You may take acetaminophen or ibuprofen  for pain, unless you were given a different pain medicine. (Note: If you have chronic liver or kidney disease or have ever had a stomach ulcer or gastrointestinal bleeding, talk with your healthcare provider before using these medicines. Also talk to your provider if you are taking medicine to prevent blood clots.) Don t give aspirin to a child younger than age 19 unless directed by the child s provider. Taking aspirin can put a child at risk for Reye syndrome. This is a rare but very serious disorder that most often affects the brain and the liver.  Reducing stress  If stress seems to be contributing to your symptoms, try to identify the sources of stress in your life. These aren t always obvious. Common stressors include:    Everyday hassles. These include things such as traffic jams, missed appointments, or car trouble.    Major life changes. These can be good, such as a new baby or job promotion. And they can be bad, such as losing a job or losing a loved one.    Overload. The feeling that you have too many responsibilities and can't take care of everything at once.    Helplessness. Feeling like your problems are more than you can solve.  When possible, do something about your sources of stress. See if you can avoid hassles, limit the amount of change in your life at one time, and take breaks when you feel overloaded.  Unfortunately, many stressful situations cannot be avoided. So learning how to manage stress better is very important. Getting regular exercise, eating nutritious, balanced meals, and getting adequate rest all help to make everyday stress more manageable. Certain techniques are also helpful: relaxation and breathing exercises, visualization, biofeedback, meditation, or simply taking some time out to clear your mind. For more information, talk with your healthcare provider.  Follow-up care  Follow up with your healthcare provider, or as advised. Further testing and additional treatment may be  required. If changes to your lifestyle do not improve your symptoms, talk with your healthcare provider about other available therapies. These include bite guards for help with teeth grinding, stress management techniques, and more. If stress is an important factor and does not respond to the above simple measures, talk with your healthcare provider about a referral for stress management.  If X-rays were done, they will be reviewed by a specialist. You will be notified of the results, especially if they affect treatment.  Call 911  Call 911 if any of these occur:    Trouble breathing or swallowing, wheezing    Confusion    Extreme drowsiness or trouble awakening    Fainting or loss of consciousness    Rapid heart rate  When to seek medical advice  Call your healthcare provider right away if any of these occur:    Swollen or red face    Pain gets worse    Neck, mouth, tooth, or throat pain gets worse    Fever of 100.4 F (38 C) or higher, or as directed by your healthcare provider  Date Last Reviewed: 10/1/2017    1767-8073 [employer]. [address] [place] [address], [place] [address]. All rights reserved. This information is not intended as a substitute for professional medical care. Always follow your healthcare professional's instructions.

## 2019-05-16 ENCOUNTER — APPOINTMENT (OUTPATIENT)
Dept: ULTRASOUND IMAGING | Facility: CLINIC | Age: 24
End: 2019-05-16
Attending: EMERGENCY MEDICINE
Payer: COMMERCIAL

## 2019-05-16 ENCOUNTER — HOSPITAL ENCOUNTER (EMERGENCY)
Facility: CLINIC | Age: 24
Discharge: HOME | End: 2019-05-16
Attending: EMERGENCY MEDICINE | Admitting: EMERGENCY MEDICINE
Payer: COMMERCIAL

## 2019-05-16 VITALS
OXYGEN SATURATION: 98 % | RESPIRATION RATE: 12 BRPM | DIASTOLIC BLOOD PRESSURE: 76 MMHG | HEART RATE: 93 BPM | SYSTOLIC BLOOD PRESSURE: 118 MMHG | TEMPERATURE: 98.1 F

## 2019-05-16 DIAGNOSIS — K80.50 BILIARY COLIC: ICD-10-CM

## 2019-05-16 LAB
ALBUMIN SERPL-MCNC: 3.6 G/DL (ref 3.4–5)
ALP SERPL-CCNC: 53 U/L (ref 40–150)
ALT SERPL W P-5'-P-CCNC: 19 U/L (ref 0–50)
ANION GAP SERPL CALCULATED.3IONS-SCNC: 8 MMOL/L (ref 3–14)
AST SERPL W P-5'-P-CCNC: 12 U/L (ref 0–45)
B-HCG FREE SERPL-ACNC: <5 IU/L
BASOPHILS # BLD AUTO: 0.1 10E9/L (ref 0–0.2)
BASOPHILS NFR BLD AUTO: 0.7 %
BILIRUB SERPL-MCNC: 0.4 MG/DL (ref 0.2–1.3)
BUN SERPL-MCNC: 11 MG/DL (ref 7–30)
CALCIUM SERPL-MCNC: 8.6 MG/DL (ref 8.5–10.1)
CHLORIDE SERPL-SCNC: 106 MMOL/L (ref 94–109)
CO2 SERPL-SCNC: 24 MMOL/L (ref 20–32)
CREAT SERPL-MCNC: 0.76 MG/DL (ref 0.52–1.04)
DIFFERENTIAL METHOD BLD: NORMAL
EOSINOPHIL # BLD AUTO: 0.3 10E9/L (ref 0–0.7)
EOSINOPHIL NFR BLD AUTO: 2.8 %
ERYTHROCYTE [DISTWIDTH] IN BLOOD BY AUTOMATED COUNT: 12.7 % (ref 10–15)
GFR SERPL CREATININE-BSD FRML MDRD: >90 ML/MIN/{1.73_M2}
GLUCOSE SERPL-MCNC: 102 MG/DL (ref 70–99)
HCT VFR BLD AUTO: 39.4 % (ref 35–47)
HGB BLD-MCNC: 13.3 G/DL (ref 11.7–15.7)
IMM GRANULOCYTES # BLD: 0 10E9/L (ref 0–0.4)
IMM GRANULOCYTES NFR BLD: 0.3 %
LIPASE SERPL-CCNC: 138 U/L (ref 73–393)
LYMPHOCYTES # BLD AUTO: 1.8 10E9/L (ref 0.8–5.3)
LYMPHOCYTES NFR BLD AUTO: 17.2 %
MCH RBC QN AUTO: 31.6 PG (ref 26.5–33)
MCHC RBC AUTO-ENTMCNC: 33.8 G/DL (ref 31.5–36.5)
MCV RBC AUTO: 94 FL (ref 78–100)
MONOCYTES # BLD AUTO: 0.6 10E9/L (ref 0–1.3)
MONOCYTES NFR BLD AUTO: 5.2 %
NEUTROPHILS # BLD AUTO: 7.9 10E9/L (ref 1.6–8.3)
NEUTROPHILS NFR BLD AUTO: 73.8 %
NRBC # BLD AUTO: 0 10*3/UL
NRBC BLD AUTO-RTO: 0 /100
PLATELET # BLD AUTO: 328 10E9/L (ref 150–450)
POTASSIUM SERPL-SCNC: 3.9 MMOL/L (ref 3.4–5.3)
PROT SERPL-MCNC: 7.5 G/DL (ref 6.8–8.8)
RBC # BLD AUTO: 4.21 10E12/L (ref 3.8–5.2)
SODIUM SERPL-SCNC: 138 MMOL/L (ref 133–144)
WBC # BLD AUTO: 10.7 10E9/L (ref 4–11)

## 2019-05-16 PROCEDURE — 99284 EMERGENCY DEPT VISIT MOD MDM: CPT | Mod: 25

## 2019-05-16 PROCEDURE — 84702 CHORIONIC GONADOTROPIN TEST: CPT

## 2019-05-16 PROCEDURE — 25000132 ZZH RX MED GY IP 250 OP 250 PS 637: Performed by: EMERGENCY MEDICINE

## 2019-05-16 PROCEDURE — 83690 ASSAY OF LIPASE: CPT | Performed by: EMERGENCY MEDICINE

## 2019-05-16 PROCEDURE — 76705 ECHO EXAM OF ABDOMEN: CPT

## 2019-05-16 PROCEDURE — 85025 COMPLETE CBC W/AUTO DIFF WBC: CPT | Performed by: EMERGENCY MEDICINE

## 2019-05-16 PROCEDURE — 80053 COMPREHEN METABOLIC PANEL: CPT | Performed by: EMERGENCY MEDICINE

## 2019-05-16 PROCEDURE — 25000125 ZZHC RX 250: Performed by: EMERGENCY MEDICINE

## 2019-05-16 RX ORDER — ONDANSETRON 4 MG/1
4 TABLET, ORALLY DISINTEGRATING ORAL EVERY 8 HOURS PRN
Qty: 10 TABLET | Refills: 0 | Status: SHIPPED | OUTPATIENT
Start: 2019-05-16 | End: 2019-05-19

## 2019-05-16 RX ADMIN — RANITIDINE 150 MG: 150 TABLET ORAL at 08:50

## 2019-05-16 RX ADMIN — LIDOCAINE HYDROCHLORIDE 30 ML: 20 SOLUTION ORAL; TOPICAL at 07:57

## 2019-05-16 ASSESSMENT — ENCOUNTER SYMPTOMS
CONSTIPATION: 1
VOMITING: 0
DIARRHEA: 0
DYSURIA: 0
FREQUENCY: 0
HEMATURIA: 0
HEADACHES: 1
FEVER: 0

## 2019-05-16 NOTE — ED TRIAGE NOTES
"Upper abd pain since 0200. No n/v/d or dysuria. \"feels like there's an air bubble in there\". No abd surgeries. LMP 4/17. ABC intact.   "

## 2019-05-16 NOTE — LETTER
May 16, 2019      To Whom It May Concern:      Martha Stanley was seen in our Emergency Department today, 05/16/19.  I expect her condition to improve over the next 3 days.  She may return to work when improved.    Sincerely,        Rudy Veliz RN

## 2019-05-16 NOTE — ED PROVIDER NOTES
History     Chief Complaint:  Abdominal Pain      HPI   Martha Stanley is a 24 year old female who presents with diffuse abdominal pain since 0200.  Pain is located in epigastric and RUQ region. Martha has tried applying pressure and heat to the area but the pain has not subsided. She endorses mild constipation and a headache that started 2-3 days ago. Martha has been taking Advil (2 pills/day) every day since then. She has not had pain like this before. She states that she ate wild rice soup for dinner last night. She denies recent caffeine use. She also reports that she was diagnosed with strep throat 13 days ago (05/03/2019) and has finished her antibiotics. Martha has had no past abdominal surgeries. Her last period was a month ago (04/17/19).    She denies fever, diarrhea, blood in stool, vomiting, hematuria, dysuria and urinary frequency. There is also no vaginal bleeding.     Allergies:  Penicillins     Medications:    Effexor   Lamictal  Seroquel  Freda    Past Medical History:    History reviewed.  No significant past medical history.     Past Surgical History:    History reviewed. No pertinent past surgical history.    Family History:    History reviewed. No pertinent family history.    Social History:  Presents to the ED alone.   Tobacco Use: Never  Alcohol Use: Occasionally  PCP: Damari Lind  Marital Status:  Single [1]     Review of Systems   Constitutional: Negative for fever.   Gastrointestinal: Positive for constipation. Negative for diarrhea and vomiting.   Genitourinary: Negative for dysuria, frequency, hematuria and vaginal bleeding.   Neurological: Positive for headaches.   All other systems reviewed and are negative.      Physical Exam   First Vitals:  BP: 126/84  Pulse: 93  Temp: 98.1  F (36.7  C)  Resp: 12  Weight: (P) 61.7 kg (136 lb)  SpO2: 99 %      Physical Exam  General:   Well-nourished   Speaking in full sentences  Eyes:   Conjunctiva without injection or scleral  icterus  ENT:   Moist mucous membranes   Nares patent   Pinnae normal  Neck:   Full ROM   No stiffness appreciated  Resp:   Lungs CTAB   No crackles, wheezing or audible rubs   Good air movement  CV:    Normal rate, regular rhythm   S1 and S2 present   No murmur, gallop or rub  GI:   BS present   Abdomen soft without distention   Mild tenderness to epigastric region   Mild RUQ tenderness   No RLQ pain   No CVA tenderness   No guarding or rebound tenderness  Skin:   Warm, dry, well perfused   No rashes or open wounds on exposed skin  MSK:   Moves all extremities   No focal deformities or swelling  Neuro:   Alert   Answers questions appropriately   Moves all extremities equally   Gait stable  Psych:   Normal affect, normal mood    Emergency Department Course     Imaging:  Radiographic findings were communicated with the patient who voiced understanding of the findings.    Abdomen US, limited (RUQ only)   Preliminary Result   IMPRESSION:    1. Sludge and stones are noted within the gallbladder. No other   sonographic evidence for cholecystitis.   2. Otherwise unremarkable right upper quadrant ultrasound.         Laboratory:  CBC:  WBC 10.7, HGB 13.3, , otherwise WNL  BMP: Glucose 102 (H), otherwise WNL (Creatinine 0.76)  Lipase: 138  HCG: Negative    Interventions:  0757: GI cocktail, 30 mL, oral   0850: Zantac, 150 mg, oral    Emergency Department Course:  The patient arrived in triage where vitals were measured and recorded.   The patient was then escorted back to the emergency department.   The patient's medical records were reviewed.  Nursing notes and vitals were reviewed.  0730: I performed an exam of the patient as documented above.  The above workup was undertaken.  0816: I rechecked the patient and discussed results.  0924: I rechecked the patient and discussed results.   Findings and plan explained to the Patient. Patient discharged home, status improved, with instructions regarding supportive care,  medications, and reasons to return as well as the importance of close follow-up was reviewed. Patient was prescribed Zofran.     Impression & Plan      Medical Decision Making:  Martha Stanley is a 24 year old female who presented with abdominal pain.  VS on presentation reveal no acute abnormalities.  Differential diagnosis includes, though is not limited to, biliary colic, cholecystitis, choledocholithiasis, ascending cholangitis, pancreatitis, gastritis, peptic ulcer disease, nephrolithiasis, pyelonephritis, pulmonary embolism, pneumonia, cardiac ischemia, pneumothorax, among others.  History, examination, and ED work-up is most consistent with biliary colic.  Ultrasound has confirmed the presence of gallstones.  There is no clinical, laboratory, or ultrasound evidence of cholecystitis, choledocholithiasis, gallstone pancreatitis, or ascending cholangitis.  There is no chest pain or ACS equivalent symptom to suggest the patient s symptoms are cardiac in nature. There is no lower abdominal tenderness to suggest appendicitis, diverticulitis, or other acute process. On recheck, she is feeling significantly better. On re-examination, abdominal exam is reassuring, pain is controlled, and she is tolerating POs. I recommended avoiding fatty foods, and to return to the ED immediately for uncontrolled pain, vomiting, fever, or any other concerning symptoms. I discussed the natural history of symptomatic cholelithiasis, and I recommended outpatient follow up with general surgery in 3-5 days for further consultation and care.  Analgesics and anti-emetics have been prescribed.     Diagnosis:    ICD-10-CM    1. Biliary colic K80.50        Disposition:  Discharged to home.     Discharge Medications:     Medication List      Started    ondansetron 4 MG ODT tab  Commonly known as:  ZOFRAN ODT  4 mg, Oral, EVERY 8 HOURS PRN            Damari ZHENG am serving as a scribe on 5/16/2019 at 7:30 AM to personally document  services performed by Dr. Porter based on my observations and the provider's statements to me.   [facility] EMERGENCY DEPARTMENT       Jean Carlos Porter MD  05/16/19 2359

## 2019-05-16 NOTE — ED AVS SNAPSHOT
[facility] Emergency Department  [address]  [facility] [address]  Phone:  [phone]  Fax:  [phone]                                    Martha Stanley   MRN: [id number]    Department:  [facility] Emergency Department   Date of Visit:  5/16/2019           After Visit Summary Signature Page    I have received my discharge instructions, and my questions have been answered. I have discussed any challenges I see with this plan with the nurse or doctor.    ..........................................................................................................................................  Patient/Patient Representative Signature      ..........................................................................................................................................  Patient Representative Print Name and Relationship to Patient    ..................................................               ................................................  Date                                   Time    ..........................................................................................................................................  Reviewed by Signature/Title    ...................................................              ..............................................  Date                                               Time          22EPIC Rev 08/18

## 2019-06-08 ENCOUNTER — HOSPITAL ENCOUNTER (EMERGENCY)
Facility: CLINIC | Age: 24
Discharge: HOME | End: 2019-06-08
Attending: EMERGENCY MEDICINE | Admitting: EMERGENCY MEDICINE
Payer: COMMERCIAL

## 2019-06-08 VITALS
RESPIRATION RATE: 22 BRPM | SYSTOLIC BLOOD PRESSURE: 99 MMHG | OXYGEN SATURATION: 100 % | DIASTOLIC BLOOD PRESSURE: 59 MMHG | TEMPERATURE: 97.6 F | HEART RATE: 107 BPM

## 2019-06-08 DIAGNOSIS — F10.920 ALCOHOLIC INTOXICATION WITHOUT COMPLICATION (H): ICD-10-CM

## 2019-06-08 PROCEDURE — 96361 HYDRATE IV INFUSION ADD-ON: CPT

## 2019-06-08 PROCEDURE — 99284 EMERGENCY DEPT VISIT MOD MDM: CPT | Mod: 25

## 2019-06-08 PROCEDURE — 25000128 H RX IP 250 OP 636: Performed by: EMERGENCY MEDICINE

## 2019-06-08 PROCEDURE — 96374 THER/PROPH/DIAG INJ IV PUSH: CPT

## 2019-06-08 RX ORDER — ONDANSETRON 2 MG/ML
4 INJECTION INTRAMUSCULAR; INTRAVENOUS
Status: COMPLETED | OUTPATIENT
Start: 2019-06-08 | End: 2019-06-08

## 2019-06-08 RX ORDER — ONDANSETRON 2 MG/ML
4 INJECTION INTRAMUSCULAR; INTRAVENOUS ONCE
Status: DISCONTINUED | OUTPATIENT
Start: 2019-06-08 | End: 2019-06-08 | Stop reason: HOSPADM

## 2019-06-08 RX ADMIN — SODIUM CHLORIDE 1000 ML: 9 INJECTION, SOLUTION INTRAVENOUS at 01:20

## 2019-06-08 RX ADMIN — ONDANSETRON 4 MG: 2 INJECTION INTRAMUSCULAR; INTRAVENOUS at 01:25

## 2019-06-08 ASSESSMENT — ENCOUNTER SYMPTOMS
VOMITING: 1
NAUSEA: 1
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
## 2019-06-08 NOTE — DISCHARGE INSTRUCTIONS
Do not drink in excess.  If you feel you have a problem with drinking, seek help.  Tomorrow, rest and drink lots of fluids.  If you have headache use Tylenol or ibuprofen.  Return if you have new concerns or complaints.  Otherwise, follow-up with primary care as needed.

## 2019-06-08 NOTE — ED AVS SNAPSHOT
[facility] Emergency Department  [address]  [facility] [address]  Phone:  [phone]  Fax:  [phone]                                    Martha Stanley   MRN: [id number]    Department:  [facility] Emergency Department   Date of Visit:  6/8/2019           After Visit Summary Signature Page    I have received my discharge instructions, and my questions have been answered. I have discussed any challenges I see with this plan with the nurse or doctor.    ..........................................................................................................................................  Patient/Patient Representative Signature      ..........................................................................................................................................  Patient Representative Print Name and Relationship to Patient    ..................................................               ................................................  Date                                   Time    ..........................................................................................................................................  Reviewed by Signature/Title    ...................................................              ..............................................  Date                                               Time          22EPIC Rev 08/18
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
## 2019-10-28 ENCOUNTER — HOSPITAL PATHOLOGY (OUTPATIENT)
Dept: OTHER | Facility: CLINIC | Age: 24
End: 2019-10-28

## 2019-10-29 LAB — COPATH REPORT: NORMAL

## 2020-01-19 ENCOUNTER — APPOINTMENT (OUTPATIENT)
Dept: CT IMAGING | Facility: CLINIC | Age: 25
End: 2020-01-19
Attending: EMERGENCY MEDICINE
Payer: COMMERCIAL

## 2020-01-19 ENCOUNTER — HOSPITAL ENCOUNTER (EMERGENCY)
Facility: CLINIC | Age: 25
Discharge: HOME | End: 2020-01-19
Attending: EMERGENCY MEDICINE | Admitting: EMERGENCY MEDICINE
Payer: COMMERCIAL

## 2020-01-19 VITALS
DIASTOLIC BLOOD PRESSURE: 70 MMHG | HEART RATE: 68 BPM | OXYGEN SATURATION: 95 % | TEMPERATURE: 98.2 F | SYSTOLIC BLOOD PRESSURE: 109 MMHG | RESPIRATION RATE: 16 BRPM | WEIGHT: 135 LBS
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
## 2020-01-19 DIAGNOSIS — S16.1XXA STRAIN OF NECK MUSCLE, INITIAL ENCOUNTER: ICD-10-CM

## 2020-01-19 PROCEDURE — 25000132 ZZH RX MED GY IP 250 OP 250 PS 637: Performed by: EMERGENCY MEDICINE

## 2020-01-19 PROCEDURE — 99284 EMERGENCY DEPT VISIT MOD MDM: CPT | Mod: 25

## 2020-01-19 PROCEDURE — 72125 CT NECK SPINE W/O DYE: CPT

## 2020-01-19 PROCEDURE — 70450 CT HEAD/BRAIN W/O DYE: CPT

## 2020-01-19 RX ORDER — L. ACIDOPHILUS/PECTIN, CITRUS 25MM-100MG
1 TABLET ORAL DAILY
COMMUNITY

## 2020-01-19 RX ORDER — QUETIAPINE FUMARATE 100 MG/1
100 TABLET, FILM COATED ORAL DAILY
COMMUNITY

## 2020-01-19 RX ORDER — MULTIPLE VITAMINS W/ MINERALS TAB 9MG-400MCG
1 TAB ORAL DAILY
COMMUNITY

## 2020-01-19 RX ORDER — PROPRANOLOL HYDROCHLORIDE 10 MG/1
10 TABLET ORAL DAILY PRN
COMMUNITY

## 2020-01-19 RX ORDER — HYDROCODONE BITARTRATE AND ACETAMINOPHEN 5; 325 MG/1; MG/1
1 TABLET ORAL EVERY 6 HOURS PRN
Status: DISCONTINUED | OUTPATIENT
Start: 2020-01-19 | End: 2020-01-19 | Stop reason: HOSPADM

## 2020-01-19 RX ORDER — TRAZODONE HYDROCHLORIDE 50 MG/1
50 TABLET, FILM COATED ORAL
COMMUNITY

## 2020-01-19 RX ORDER — CYCLOBENZAPRINE HCL 10 MG
10 TABLET ORAL ONCE
Status: COMPLETED | OUTPATIENT
Start: 2020-01-19 | End: 2020-01-19

## 2020-01-19 RX ORDER — IBUPROFEN 600 MG/1
600 TABLET, FILM COATED ORAL EVERY 8 HOURS PRN
Qty: 30 TABLET | Refills: 0 | Status: SHIPPED | OUTPATIENT
Start: 2020-01-19

## 2020-01-19 RX ORDER — BIOTIN 1 MG
10000 TABLET ORAL DAILY
COMMUNITY

## 2020-01-19 RX ORDER — SPIRONOLACTONE 100 MG/1
100 TABLET, FILM COATED ORAL DAILY
COMMUNITY

## 2020-01-19 RX ORDER — CYCLOBENZAPRINE HCL 10 MG
10 TABLET ORAL 3 TIMES DAILY PRN
Qty: 15 TABLET | Refills: 0 | Status: SHIPPED | OUTPATIENT
Start: 2020-01-19 | End: 2020-01-24

## 2020-01-19 RX ORDER — IBUPROFEN 800 MG/1
800 TABLET, FILM COATED ORAL ONCE
Status: COMPLETED | OUTPATIENT
Start: 2020-01-19 | End: 2020-01-19

## 2020-01-19 RX ADMIN — CYCLOBENZAPRINE HYDROCHLORIDE 10 MG: 10 TABLET, FILM COATED ORAL at 16:47

## 2020-01-19 RX ADMIN — IBUPROFEN 800 MG: 800 TABLET ORAL at 16:47
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
## 2020-01-19 NOTE — LETTER
January 19, 2020      To Whom It May Concern:      Martha Stanley was seen in our Emergency Department today, 01/19/20.  I expect her condition to improve over the next 2-3 days.  She may return to work when improved.    Sincerely,        Filipe Odom MD

## 2020-01-19 NOTE — ED AVS SNAPSHOT
[facility] Emergency Department  [address]  [facility] [address]  Phone:  [phone]  Fax:  [phone]                                    Martha Stanley   MRN: [id number]    Department:  [facility] Emergency Department   Date of Visit:  1/19/2020           After Visit Summary Signature Page    I have received my discharge instructions, and my questions have been answered. I have discussed any challenges I see with this plan with the nurse or doctor.    ..........................................................................................................................................  Patient/Patient Representative Signature      ..........................................................................................................................................  Patient Representative Print Name and Relationship to Patient    ..................................................               ................................................  Date                                   Time    ..........................................................................................................................................  Reviewed by Signature/Title    ...................................................              ..............................................  Date                                               Time          22EPIC Rev 08/18
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
## 2020-01-20 ASSESSMENT — ENCOUNTER SYMPTOMS
ABDOMINAL PAIN: 0
NUMBNESS: 0
HEADACHES: 1
NECK PAIN: 1
SHORTNESS OF BREATH: 0
WEAKNESS: 0
VOMITING: 0
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
